# Patient Record
Sex: MALE | Race: WHITE
[De-identification: names, ages, dates, MRNs, and addresses within clinical notes are randomized per-mention and may not be internally consistent; named-entity substitution may affect disease eponyms.]

---

## 2021-08-09 ENCOUNTER — HOSPITAL ENCOUNTER (OUTPATIENT)
Dept: HOSPITAL 95 - LAB | Age: 71
Discharge: HOME | End: 2021-08-09
Attending: NURSE PRACTITIONER
Payer: MEDICARE

## 2021-08-09 DIAGNOSIS — I10: Primary | ICD-10-CM

## 2022-09-08 ENCOUNTER — HOSPITAL ENCOUNTER (OUTPATIENT)
Dept: HOSPITAL 95 - ORSCSDS | Age: 72
Discharge: HOME | End: 2022-09-08
Attending: PODIATRIST
Payer: MEDICARE

## 2022-09-08 VITALS — WEIGHT: 210.98 LBS | BODY MASS INDEX: 30.2 KG/M2 | HEIGHT: 70 IN

## 2022-09-08 DIAGNOSIS — M25.571: ICD-10-CM

## 2022-09-08 DIAGNOSIS — Z79.899: ICD-10-CM

## 2022-09-08 DIAGNOSIS — M19.071: Primary | ICD-10-CM

## 2022-09-08 DIAGNOSIS — I10: ICD-10-CM

## 2022-09-08 PROCEDURE — 29895 ANKLE ARTHROSCOPY/SURGERY: CPT

## 2022-09-08 PROCEDURE — 29897 ANKLE ARTHROSCOPY/SURGERY: CPT

## 2022-09-08 PROCEDURE — 3E0U3GC INTRODUCTION OF OTHER THERAPEUTIC SUBSTANCE INTO JOINTS, PERCUTANEOUS APPROACH: ICD-10-PCS | Performed by: PODIATRIST

## 2022-09-08 PROCEDURE — A9270 NON-COVERED ITEM OR SERVICE: HCPCS

## 2022-09-08 PROCEDURE — 0232T NJX PLATELET PLASMA: CPT

## 2022-09-08 PROCEDURE — 0SBF4ZZ EXCISION OF RIGHT ANKLE JOINT, PERCUTANEOUS ENDOSCOPIC APPROACH: ICD-10-PCS | Performed by: PODIATRIST

## 2022-09-08 NOTE — NUR
09/08/22 MELANI DORSEY
1MG OF EPI ADDED TO 1ST BAG OF LR(3000MLS) TO USE DURING CASE FOR
IRRIGATION
 
0.05MG OF EPI (1MG/1ML) ADDED TO 10MLS OF ROPIVACAINE 0.5% TO CREATE A
LOCAL SOLUTION OF ROPIVACAINE 0.5% WITH EPI 1:200,000. LOCAL POURED
ONTO STERILE FIELD FOR USE DURING CASE.